# Patient Record
Sex: FEMALE
[De-identification: names, ages, dates, MRNs, and addresses within clinical notes are randomized per-mention and may not be internally consistent; named-entity substitution may affect disease eponyms.]

---

## 2023-03-20 PROBLEM — Z00.00 ENCOUNTER FOR PREVENTIVE HEALTH EXAMINATION: Status: ACTIVE | Noted: 2023-03-20

## 2023-03-31 ENCOUNTER — APPOINTMENT (OUTPATIENT)
Dept: SURGICAL ONCOLOGY | Facility: CLINIC | Age: 53
End: 2023-03-31
Payer: COMMERCIAL

## 2023-03-31 VITALS
OXYGEN SATURATION: 98 % | HEART RATE: 104 BPM | RESPIRATION RATE: 16 BRPM | WEIGHT: 140 LBS | DIASTOLIC BLOOD PRESSURE: 76 MMHG | BODY MASS INDEX: 23.32 KG/M2 | HEIGHT: 65 IN | SYSTOLIC BLOOD PRESSURE: 119 MMHG

## 2023-03-31 DIAGNOSIS — Q44.4 CHOLEDOCHAL CYST: ICD-10-CM

## 2023-03-31 PROCEDURE — 99205 OFFICE O/P NEW HI 60 MIN: CPT

## 2023-04-13 PROBLEM — Q44.4 CHOLEDOCHAL CYST: Status: ACTIVE | Noted: 2023-04-13

## 2023-04-14 NOTE — ASSESSMENT
[FreeTextEntry1] : Ms. ABEL CHAIDEZ is a 52 year old female who presents today for an initial consultation. [place] [facility] [place] radiation in Jan 2023. Was planning to start tamoxifen but states her oncology team was concerned with fibroids and she underwent a hysterectomy 2 months ago. Recovering well. Through this workup she underwent PET 1/17/23 and was noted to have a choledochal cyst. \par \par She presents today for a 4th opinion on management of the choledochal cyst. received recommendations for observation and resection. I reviewed her imaging including MRI 2/8/23 and MRI 3/17/23 that noted a type IV choledochal cyst, fusiform dilation of common hepatic duct extending into proximal right and left intrahepatic ducts and into proximal cystic duct. Normal caliber of distal common bile duct and more peripheral intrahepatic bile ducts. No suspicious liver masses. Discussed the natural hx of choledochal cysts and the different types with associated malignant potential.  Discussed that these are uncommon in general and most of the data on management comes out of [place] where they are slightly more prevalent.  Discussed options including observation vs resection which is the standard of care in cases like these, although there is not a significant amount of data to guide recommendation. We reviewed morbidity/mortality risk associated with surgery.  Discussed that my opinion is the risk of surgery is less than the risk of observation given her age and hx. The patient is very nervous about pursuing surgery. Discussed that should she decide to move forward with surgical resection, she would likely require an extrahepatic bile duct resection. Discussed that this isnt emergent and if she decides to delay surgery i would recommend a 6 month follow up MRI with labs for close observation. We did discuss the risk of observation as well and reviewed that its possible to develop cholangitis or progression/degeneration in the interim. the patient would like to think about her options and will contact the office should she decide to seek care here and will get her set up for surgery vs. MRI and labs in 6 months. \par \par Today, I personally spent 60 minutes in total time including reviewing imaging and studies, discussing complex treatment regimens, direct face to face time with the patient, patient education and counseling.\par

## 2023-04-14 NOTE — PHYSICAL EXAM
[FreeTextEntry1] : General: No acute distress. Well nourished and well kempt.\par Head: AT/NC\par Eyes: PERRL. EOMI.\par Pulmonary: No respiratory distress.\par Extremities: Warm. No edema. \par Neurological: A&O x 4.\par Psychiatric: Normal affect and mood.\par

## 2023-04-14 NOTE — HISTORY OF PRESENT ILLNESS
[de-identified] : Ms. ABEL CHAIDEZ is a 52 year old female who presents today for an initial consultation. PMH of breast cancer treated at [facility] [place] 9/2022, completed radiation in Jan 2023. Was planning to start tamoxifen but states her oncology team was concerned with fibroids and she underwent a hysterectomy 2 months ago. Recovering well. Through this workup she underwent PET 1/17/23 and was noted to have a choledochal cyst. Underwent follow up MRI 2/8/23 and additional MRI 3/17/23 that noted a type IV choledochal cyst, fusiform dilation of common hepatic duct extending into proximal right and left intrahepatic ducts and into proximal cystic duct. Normal caliber of distal common bile duct and more peripheral intrahepatic bile ducts. No suspicious liver masses. \par She presents today for a 4th opinion on management of the choledochal cyst. She is completely asymptomatic from this perspective. Feels well today, no complaints. FHx of sister with breast cancer, father with lung sarcoma, PGF with unknown cancer. Genetic testing was negative per patient report. Came to the [place] at age 13 from [facility].